# Patient Record
Sex: FEMALE | Race: WHITE | URBAN - METROPOLITAN AREA
[De-identification: names, ages, dates, MRNs, and addresses within clinical notes are randomized per-mention and may not be internally consistent; named-entity substitution may affect disease eponyms.]

---

## 2023-04-27 ENCOUNTER — OFFICE VISIT (OUTPATIENT)
Dept: URBAN - METROPOLITAN AREA CLINIC 26 | Facility: CLINIC | Age: 58
End: 2023-04-27
Payer: MEDICAID

## 2023-04-27 DIAGNOSIS — H04.123 DRY EYE SYNDROME OF BILATERAL LACRIMAL GLANDS: ICD-10-CM

## 2023-04-27 DIAGNOSIS — H52.4 PRESBYOPIA: ICD-10-CM

## 2023-04-27 DIAGNOSIS — E11.9 TYPE 2 DIABETES MELLITUS W/O COMPLICATION: ICD-10-CM

## 2023-04-27 DIAGNOSIS — H25.13 AGE-RELATED NUCLEAR CATARACT, BILATERAL: Primary | ICD-10-CM

## 2023-04-27 PROCEDURE — 92004 COMPRE OPH EXAM NEW PT 1/>: CPT | Performed by: OPTOMETRIST

## 2023-04-27 ASSESSMENT — VISUAL ACUITY
OS: 20/20
OD: 20/20

## 2023-04-27 ASSESSMENT — INTRAOCULAR PRESSURE
OD: 18
OS: 18

## 2023-04-27 ASSESSMENT — KERATOMETRY
OS: 43.13
OD: 42.88

## 2023-04-27 NOTE — IMPRESSION/PLAN
Impression: Age-related nuclear cataract, bilateral: H25.13. Plan: Discussed diagnosis in detail with patient. No treatment is required at this time. Will continue to observe condition and or symptoms. Call if [address] worsens.

## 2023-04-27 NOTE — IMPRESSION/PLAN
Impression: Dry eye syndrome of bilateral lacrimal glands: H04.123.  Plan: Recommend art tears 1 gt TID ou. (sample given today)

## 2023-04-27 NOTE — IMPRESSION/PLAN
Impression: Type 2 diabetes mellitus w/o complication: M90.1. Plan: No signs of retinopathy or neovascularization noted. Discussed ocular and systemic benefits of blood sugar control. Continue blood sugar control.  RTC 1yr complete exam

## 2024-05-14 ENCOUNTER — OFFICE VISIT (OUTPATIENT)
Dept: URBAN - METROPOLITAN AREA CLINIC 26 | Facility: CLINIC | Age: 59
End: 2024-05-14
Payer: MEDICAID

## 2024-05-14 DIAGNOSIS — H52.4 PRESBYOPIA: ICD-10-CM

## 2024-05-14 DIAGNOSIS — E11.9 TYPE 2 DIABETES MELLITUS W/O COMPLICATION: Primary | ICD-10-CM

## 2024-05-14 DIAGNOSIS — H25.13 AGE-RELATED NUCLEAR CATARACT, BILATERAL: ICD-10-CM

## 2024-05-14 DIAGNOSIS — H04.123 DRY EYE SYNDROME OF BILATERAL LACRIMAL GLANDS: ICD-10-CM

## 2024-05-14 PROCEDURE — 92014 COMPRE OPH EXAM EST PT 1/>: CPT | Performed by: OPTOMETRIST

## 2024-05-14 ASSESSMENT — INTRAOCULAR PRESSURE
OD: 15
OS: 15

## 2024-05-14 ASSESSMENT — VISUAL ACUITY
OD: 20/20
OS: 20/20

## 2025-05-14 ENCOUNTER — OFFICE VISIT (OUTPATIENT)
Dept: URBAN - METROPOLITAN AREA CLINIC 26 | Facility: CLINIC | Age: 60
End: 2025-05-14
Payer: MEDICAID

## 2025-05-14 DIAGNOSIS — E11.9 TYPE 2 DIABETES MELLITUS W/O COMPLICATION: Primary | ICD-10-CM

## 2025-05-14 DIAGNOSIS — H04.123 DRY EYE SYNDROME OF BILATERAL LACRIMAL GLANDS: ICD-10-CM

## 2025-05-14 DIAGNOSIS — H25.13 AGE-RELATED NUCLEAR CATARACT, BILATERAL: ICD-10-CM

## 2025-05-14 PROCEDURE — 92014 COMPRE OPH EXAM EST PT 1/>: CPT | Performed by: OPTOMETRIST

## 2025-05-14 ASSESSMENT — KERATOMETRY
OS: 43.50
OD: 43.13

## 2025-05-14 ASSESSMENT — INTRAOCULAR PRESSURE
OS: 15
OD: 16